# Patient Record
Sex: MALE | Race: WHITE | URBAN - METROPOLITAN AREA
[De-identification: names, ages, dates, MRNs, and addresses within clinical notes are randomized per-mention and may not be internally consistent; named-entity substitution may affect disease eponyms.]

---

## 2021-12-01 ENCOUNTER — LAB OUTSIDE AN ENCOUNTER (OUTPATIENT)
Dept: URBAN - METROPOLITAN AREA CLINIC 19 | Facility: CLINIC | Age: 76
End: 2021-12-01

## 2021-12-01 ENCOUNTER — OFFICE VISIT (OUTPATIENT)
Dept: URBAN - METROPOLITAN AREA CLINIC 19 | Facility: CLINIC | Age: 76
End: 2021-12-01
Payer: COMMERCIAL

## 2021-12-01 ENCOUNTER — WEB ENCOUNTER (OUTPATIENT)
Dept: URBAN - METROPOLITAN AREA CLINIC 19 | Facility: CLINIC | Age: 76
End: 2021-12-01

## 2021-12-01 VITALS
BODY MASS INDEX: 20.72 KG/M2 | DIASTOLIC BLOOD PRESSURE: 80 MMHG | WEIGHT: 132 LBS | TEMPERATURE: 97.1 F | SYSTOLIC BLOOD PRESSURE: 100 MMHG | HEIGHT: 67 IN

## 2021-12-01 DIAGNOSIS — T73.3XXA FATIGUE DUE TO EXCESSIVE EXERTION, INITIAL ENCOUNTER: ICD-10-CM

## 2021-12-01 PROBLEM — 84229001: Status: ACTIVE | Noted: 2021-12-01

## 2021-12-01 PROBLEM — 2901004: Status: ACTIVE | Noted: 2021-12-01

## 2021-12-01 PROCEDURE — 99202 OFFICE O/P NEW SF 15 MIN: CPT | Performed by: STUDENT IN AN ORGANIZED HEALTH CARE EDUCATION/TRAINING PROGRAM

## 2021-12-01 NOTE — HPI-TODAY'S VISIT:
The pt is a 75 yo CM who presents fatigue.  Pt is accompanied by son, who provides additional history.  Pt denies any current GI issues.  Per records, in April this year, a referral was sent to our facility for concerns for melena while the pt had been on prednisone.  Pt did not come in for an appointment.  He has apparently been fatigued and laying in bed, without eating for about 6 weeks and his son insisted on the pt making a physician's appointment.  Pt made an appointment with us, without knowning we were not primary care.  Pt does not have a PCP at this time.    Other than fatigue, the son states that the pt has lost weight and also has worsening memory loss with dementia.  Pt denies any melena or GI symptoms.  Last colonoscopy was several years ago but not as much as 10 years.  Pt also denies depression, but he is unsure of it.

## 2021-12-01 NOTE — EXAM-FUNCTIONAL ASSESSMENT
Patient wearing mask due to COVID-19  General--Elderly male, in no acute distress, resting comfortably Eyes--anicteric, no pallor HENT--normocephalic, atraumatic head Neck--no lymphadenopathy, non tender Chest--normal breath sounds, equal rise Heart--regular rate and rhythm, no pedal edema in BLE Abdomen--soft, non tender, non distended, scaphoid, with bowel sounds present, no hepatomegaly

## 2021-12-05 LAB
BASO (ABSOLUTE): 0.1
BASOS: 2
EOS (ABSOLUTE): 0.1
EOS: 2
HEMATOCRIT: 39.6
HEMATOLOGY COMMENTS:: (no result)
HEMOGLOBIN: 14.1
IMMATURE CELLS: (no result)
IMMATURE GRANS (ABS): 0
IMMATURE GRANULOCYTES: 1
LYMPHS (ABSOLUTE): 1.7
LYMPHS: 39
MCH: 34.5
MCHC: 35.6
MCV: 97
MONOCYTES(ABSOLUTE): 0.7
MONOCYTES: 15
NEUTROPHILS (ABSOLUTE): 1.8
NEUTROPHILS: 41
NRBC: (no result)
PLATELETS: 167
RBC: 4.09
RDW: 13.3
WBC: 4.4

## 2022-10-07 ENCOUNTER — OFFICE VISIT (OUTPATIENT)
Dept: URBAN - METROPOLITAN AREA CLINIC 128 | Facility: CLINIC | Age: 77
End: 2022-10-07
Payer: COMMERCIAL

## 2022-10-07 VITALS
DIASTOLIC BLOOD PRESSURE: 81 MMHG | HEIGHT: 67 IN | WEIGHT: 152.6 LBS | BODY MASS INDEX: 23.95 KG/M2 | SYSTOLIC BLOOD PRESSURE: 168 MMHG

## 2022-10-07 DIAGNOSIS — Z12.11 COLON CANCER SCREENING: ICD-10-CM

## 2022-10-07 DIAGNOSIS — F10.10 ALCOHOL ABUSE: ICD-10-CM

## 2022-10-07 PROCEDURE — 99214 OFFICE O/P EST MOD 30 MIN: CPT | Performed by: INTERNAL MEDICINE

## 2022-10-07 NOTE — HPI-TODAY'S VISIT:
Mr. Gonzalez is a 77 year old male who was last seen in GI clinic on 12/1/2021 with Dr. Kelly.   His son, Akash, is with him today as Mr. Gonzalez has issues with memory.   His son reports he has a history of alcohol abuse but quick alcohol completely 4/2022.   We reviewed his PCP's notes and his labs on 7/1/2022 showed HgB 13.5, Plt 174, AST 18, ALT 24, Alk phos 66, T bili 0.4, and alb 3.9.   He is unsure who did his last colonoscopy many years ago and thinks it could have been 10 years but neither son or patient can give further detail.

## 2022-10-11 LAB
A/G RATIO: 2.2
ALBUMIN: 4.3
ALKALINE PHOSPHATASE: 65
ALT (SGPT): 19
AST (SGOT): 15
BILIRUBIN, TOTAL: 0.6
BUN/CREATININE RATIO: 12
BUN: 8
CALCIUM: 9.2
CARBON DIOXIDE, TOTAL: 26
CHLORIDE: 103
CREATININE: 0.65
EGFR: 98
GLOBULIN, TOTAL: 2
GLUCOSE: 100
HEMATOCRIT: 41.4
HEMOGLOBIN: 14.2
INR: 1
MCH: 31.6
MCHC: 34.3
MCV: 92
MPV: 10.1
PLATELET COUNT: 191
POTASSIUM: 4
PROTEIN, TOTAL: 6.3
PT: 10.2
RDW: 13.2
RED BLOOD CELL COUNT: 4.5
SODIUM: 139
WHITE BLOOD CELL COUNT: 6.2

## 2022-10-12 ENCOUNTER — OFFICE VISIT (OUTPATIENT)
Dept: URBAN - METROPOLITAN AREA CLINIC 18 | Facility: CLINIC | Age: 77
End: 2022-10-12
Payer: COMMERCIAL

## 2022-10-12 DIAGNOSIS — F10.10 ALCOHOL ABUSE: ICD-10-CM

## 2022-10-12 PROCEDURE — 76705 ECHO EXAM OF ABDOMEN: CPT | Performed by: INTERNAL MEDICINE

## 2022-10-13 ENCOUNTER — OFFICE VISIT (OUTPATIENT)
Dept: URBAN - METROPOLITAN AREA CLINIC 91 | Facility: CLINIC | Age: 77
End: 2022-10-13

## 2022-10-18 ENCOUNTER — TELEPHONE ENCOUNTER (OUTPATIENT)
Dept: URBAN - METROPOLITAN AREA CLINIC 92 | Facility: CLINIC | Age: 77
End: 2022-10-18

## 2022-11-02 ENCOUNTER — OFFICE VISIT (OUTPATIENT)
Dept: URBAN - METROPOLITAN AREA SURGERY CENTER 31 | Facility: SURGERY CENTER | Age: 77
End: 2022-11-02

## 2022-12-01 ENCOUNTER — CLAIMS CREATED FROM THE CLAIM WINDOW (OUTPATIENT)
Dept: URBAN - METROPOLITAN AREA SURGERY CENTER 31 | Facility: SURGERY CENTER | Age: 77
End: 2022-12-01
Payer: COMMERCIAL

## 2022-12-01 ENCOUNTER — CLAIMS CREATED FROM THE CLAIM WINDOW (OUTPATIENT)
Dept: URBAN - METROPOLITAN AREA SURGERY CENTER 31 | Facility: SURGERY CENTER | Age: 77
End: 2022-12-01

## 2022-12-01 DIAGNOSIS — Z12.11 COLON CANCER SCREENING: ICD-10-CM

## 2022-12-01 PROCEDURE — 45378 DIAGNOSTIC COLONOSCOPY: CPT | Performed by: INTERNAL MEDICINE

## 2022-12-01 PROCEDURE — G8907 PT DOC NO EVENTS ON DISCHARG: HCPCS | Performed by: INTERNAL MEDICINE

## 2023-01-06 ENCOUNTER — OFFICE VISIT (OUTPATIENT)
Dept: URBAN - METROPOLITAN AREA CLINIC 128 | Facility: CLINIC | Age: 78
End: 2023-01-06
Payer: COMMERCIAL

## 2023-01-06 VITALS
HEART RATE: 65 BPM | SYSTOLIC BLOOD PRESSURE: 137 MMHG | WEIGHT: 159.6 LBS | HEIGHT: 67 IN | BODY MASS INDEX: 25.05 KG/M2 | TEMPERATURE: 97.3 F | DIASTOLIC BLOOD PRESSURE: 83 MMHG

## 2023-01-06 DIAGNOSIS — F10.10 ALCOHOL ABUSE: ICD-10-CM

## 2023-01-06 DIAGNOSIS — K57.90 DIVERTICULOSIS: ICD-10-CM

## 2023-01-06 DIAGNOSIS — Z12.11 COLON CANCER SCREENING: ICD-10-CM

## 2023-01-06 PROBLEM — 397881000: Status: ACTIVE | Noted: 2023-01-06

## 2023-01-06 PROBLEM — 15167005: Status: ACTIVE | Noted: 2022-10-07

## 2023-01-06 PROCEDURE — 99214 OFFICE O/P EST MOD 30 MIN: CPT | Performed by: INTERNAL MEDICINE

## 2023-01-06 NOTE — HPI-TODAY'S VISIT:
Mr. Gonzalez is a 77 year old male who was last seen in GI clinic on 10/7/2022 for alcohol abuse and colon cancer screening.    On 10/10/2022 Mr. Gonzalez's NaMELD was 7; alb 4.3, AST 15, ALT 19, Cr 0.65, Na 139, INR 1.0, Bilirubin 0.6, Plt 191.  On 10/12/2022 RUQ US was unremarkable.   On 12/1/2022 colonoscopy showed small internal hemorrhoids and sigmoid colon diverticulosis. Repeat was recommended in 10 years.   Prior history is summarized below: -His son, Akash, is with him today as Mr. Gonzalez has issues with memory.  -His son reports he has a history of alcohol abuse but quick alcohol completely 4/2022.  -PCP's labs on 7/1/2022 showed HgB 13.5, Plt 174, AST 18, ALT 24, Alk phos 66, T bili 0.4, and alb 3.9.

## 2023-07-14 ENCOUNTER — OFFICE VISIT (OUTPATIENT)
Dept: URBAN - METROPOLITAN AREA CLINIC 128 | Facility: CLINIC | Age: 78
End: 2023-07-14

## 2023-07-19 ENCOUNTER — DASHBOARD ENCOUNTERS (OUTPATIENT)
Age: 78
End: 2023-07-19

## 2023-07-21 ENCOUNTER — OFFICE VISIT (OUTPATIENT)
Dept: URBAN - METROPOLITAN AREA CLINIC 128 | Facility: CLINIC | Age: 78
End: 2023-07-21

## 2023-07-21 RX ORDER — PANTOPRAZOLE SODIUM 40 MG/1
1 TABLET TABLET, DELAYED RELEASE ORAL ONCE A DAY
Status: ACTIVE | COMMUNITY